# Patient Record
Sex: FEMALE | Race: WHITE
[De-identification: names, ages, dates, MRNs, and addresses within clinical notes are randomized per-mention and may not be internally consistent; named-entity substitution may affect disease eponyms.]

---

## 2020-07-30 ENCOUNTER — EMPLOYEE WELLNESS (OUTPATIENT)
Dept: OTHER | Facility: CLINIC | Age: 25
End: 2020-07-30

## 2023-10-02 ENCOUNTER — OFFICE VISIT (OUTPATIENT)
Dept: OCCUPATIONAL MEDICINE | Age: 28
End: 2023-10-02

## 2023-10-02 VITALS
OXYGEN SATURATION: 97 % | HEART RATE: 75 BPM | SYSTOLIC BLOOD PRESSURE: 122 MMHG | RESPIRATION RATE: 16 BRPM | TEMPERATURE: 97.8 F | WEIGHT: 135 LBS | BODY MASS INDEX: 23.92 KG/M2 | HEIGHT: 63 IN | DIASTOLIC BLOOD PRESSURE: 79 MMHG

## 2023-10-02 DIAGNOSIS — R30.0 DYSURIA: Primary | ICD-10-CM

## 2023-10-02 LAB
BILIRUBIN, URINE, POC: NEGATIVE
BLOOD URINE, POC: ABNORMAL
GLUCOSE URINE, POC: NEGATIVE
KETONES, URINE, POC: ABNORMAL
LEUKOCYTE ESTERASE, URINE, POC: ABNORMAL
NITRITE, URINE, POC: POSITIVE
PH, URINE, POC: 5.5 (ref 4.6–8)
PROTEIN,URINE, POC: 100
SPECIFIC GRAVITY, URINE, POC: >1.03 (ref 1–1.03)
URINALYSIS CLARITY, POC: CLEAR
URINALYSIS COLOR, POC: ABNORMAL
UROBILINOGEN, POC: NORMAL

## 2023-10-02 RX ORDER — PHENAZOPYRIDINE HYDROCHLORIDE 100 MG/1
100-200 TABLET, FILM COATED ORAL 3 TIMES DAILY PRN
Qty: 18 TABLET | Refills: 0 | Status: SHIPPED | OUTPATIENT
Start: 2023-10-02 | End: 2023-10-05

## 2023-10-02 RX ORDER — NITROFURANTOIN 25; 75 MG/1; MG/1
100 CAPSULE ORAL 2 TIMES DAILY
Qty: 10 CAPSULE | Refills: 0 | Status: SHIPPED | OUTPATIENT
Start: 2023-10-02 | End: 2023-10-07

## 2023-10-02 RX ORDER — NITROFURANTOIN 25; 75 MG/1; MG/1
100 CAPSULE ORAL 2 TIMES DAILY
Qty: 20 CAPSULE | Refills: 0 | Status: SHIPPED | OUTPATIENT
Start: 2023-10-02 | End: 2023-10-02

## 2023-10-02 ASSESSMENT — ENCOUNTER SYMPTOMS
DIARRHEA: 0
ABDOMINAL PAIN: 1
CHEST TIGHTNESS: 0
VOMITING: 0
NAUSEA: 0
SHORTNESS OF BREATH: 0

## 2023-10-02 ASSESSMENT — PATIENT HEALTH QUESTIONNAIRE - PHQ9
SUM OF ALL RESPONSES TO PHQ QUESTIONS 1-9: 0
2. FEELING DOWN, DEPRESSED OR HOPELESS: 0
1. LITTLE INTEREST OR PLEASURE IN DOING THINGS: 0
SUM OF ALL RESPONSES TO PHQ9 QUESTIONS 1 & 2: 0
SUM OF ALL RESPONSES TO PHQ QUESTIONS 1-9: 0

## 2023-10-02 NOTE — PROGRESS NOTES
PROGRESS NOTE    SUBJECTIVE     Mort Gowers is a 25 y.o. female seen for:    Chief Complaint    Dysuria         Patient presents to the [address] [place] [address] for dysuria, urinary frequency, and a feeling that she isn't able to completely empty her bladder since yesterday. Patient hasn't taken anything for pain, including Pyridium or Azo. Patient notes that she is also having her menstrual cycle and having her normal symptoms associated with her cycles, including cramps and low back pain. Patient notes that she has gotten a UTI in the past but it was when she was in high school. She states her symptoms feel the same as they did when she had UTI before. Patient notes that she has had kidney stones once before in middle school but she had flank pain then. Patient states that she went for a long run on Friday and Saturday and maybe didn't hydrate enough. She usually drinks 75 ounces of water per day. Patient states that she is establishing care with a new primary care provider on Thursday 10/5 at [facility]. Dysuria   This is a new problem. The current episode started yesterday. The problem occurs every urination. The problem has been unchanged. The quality of the pain is described as aching. The pain is at a severity of 2/10. The pain is mild. There has been no fever. The fever has been present for Less than 1 day. She is Sexually active. There is No history of pyelonephritis. Associated symptoms include frequency and hematuria. Pertinent negatives include no chills, discharge, flank pain, hesitancy, nausea, possible pregnancy, sweats, urgency or vomiting. She has tried increased fluids for the symptoms. The treatment provided mild relief. Her past medical history is significant for catheterization, kidney stones and urinary stasis. There is no history of recurrent UTIs, a single kidney or a urological procedure.        Current Outpatient Medications   Medication Sig Dispense Refill

## 2023-10-04 ENCOUNTER — TELEPHONE (OUTPATIENT)
Dept: OCCUPATIONAL MEDICINE | Age: 28
End: 2023-10-04

## 2023-10-04 NOTE — TELEPHONE ENCOUNTER
Called patient to follow up on urinary symptoms and report the results of her preliminary urine culture. Patient states that her symptoms are improving and the urinary burning is milder now and she has less urinary frequency. She notes that her urine is still a dark abdi color though. Patient denies any fevers, chills, night sweats, body aches, nausea, vomiting or flank pain. Patient states that her back and pelvic pain also went away but she thinks those were more related to her menstrual cycle. Patient states that she has been taking the Macrobid as prescribed. Advised patient that her preliminary urine culture is growing gram negative rods and we will follow up tomorrow or Friday with the final urine culture results and susceptibilities, but to continue the Macrobid as prescribed. Patient verbalized understanding and agreed with the above plan of care. Advised patient to follow up with the [facility] ([facility]) at [phone] if she needs anything else.    CLAUDINE Alvarado NP

## 2023-10-05 ENCOUNTER — TELEPHONE (OUTPATIENT)
Dept: OCCUPATIONAL MEDICINE | Age: 28
End: 2023-10-05

## 2023-10-05 LAB
BACTERIA SPEC CULT: ABNORMAL
BACTERIA SPEC CULT: ABNORMAL
SERVICE CMNT-IMP: ABNORMAL

## 2023-10-05 NOTE — TELEPHONE ENCOUNTER
Called to f/u with patient. She reports she is doing better and all symptoms have improved. She is taking Macrobid as prescribed no side effects    We reviewed the final Culture results  Bacteria is sensitive to Macrobid and UTI should resolve with treatment.  Continue and finish treatment    F/U if symptoms do not continue to improve and resolve  She denies questions or concerns